# Patient Record
Sex: FEMALE | Race: BLACK OR AFRICAN AMERICAN | NOT HISPANIC OR LATINO | URBAN - METROPOLITAN AREA
[De-identification: names, ages, dates, MRNs, and addresses within clinical notes are randomized per-mention and may not be internally consistent; named-entity substitution may affect disease eponyms.]

---

## 2017-01-27 ENCOUNTER — HISTORICAL (OUTPATIENT)
Dept: BARIATRICS | Facility: HOSPITAL | Age: 52
End: 2017-01-27

## 2017-02-13 ENCOUNTER — HISTORICAL (OUTPATIENT)
Dept: ADMINISTRATIVE | Facility: HOSPITAL | Age: 52
End: 2017-02-13

## 2017-04-17 ENCOUNTER — HISTORICAL (OUTPATIENT)
Dept: ADMINISTRATIVE | Facility: HOSPITAL | Age: 52
End: 2017-04-17

## 2017-04-18 ENCOUNTER — HISTORICAL (OUTPATIENT)
Dept: BARIATRICS | Facility: HOSPITAL | Age: 52
End: 2017-04-18

## 2017-04-18 ENCOUNTER — HISTORICAL (OUTPATIENT)
Dept: ADMINISTRATIVE | Facility: HOSPITAL | Age: 52
End: 2017-04-18

## 2021-02-24 ENCOUNTER — HISTORICAL (OUTPATIENT)
Dept: BARIATRICS | Facility: HOSPITAL | Age: 56
End: 2021-02-24

## 2021-03-03 ENCOUNTER — HISTORICAL (OUTPATIENT)
Dept: RADIOLOGY | Facility: HOSPITAL | Age: 56
End: 2021-03-03

## 2021-05-19 ENCOUNTER — HISTORICAL (OUTPATIENT)
Dept: SURGERY | Facility: HOSPITAL | Age: 56
End: 2021-05-19

## 2021-06-24 ENCOUNTER — HISTORICAL (OUTPATIENT)
Dept: RADIOLOGY | Facility: HOSPITAL | Age: 56
End: 2021-06-24

## 2021-06-30 ENCOUNTER — HISTORICAL (OUTPATIENT)
Dept: ANESTHESIOLOGY | Facility: HOSPITAL | Age: 56
End: 2021-06-30

## 2021-08-03 ENCOUNTER — HISTORICAL (OUTPATIENT)
Dept: BARIATRICS | Facility: HOSPITAL | Age: 56
End: 2021-08-03

## 2021-09-07 ENCOUNTER — HISTORICAL (OUTPATIENT)
Dept: BARIATRICS | Facility: HOSPITAL | Age: 56
End: 2021-09-07

## 2021-10-05 ENCOUNTER — HISTORICAL (OUTPATIENT)
Dept: BARIATRICS | Facility: HOSPITAL | Age: 56
End: 2021-10-05

## 2021-10-20 ENCOUNTER — HISTORICAL (OUTPATIENT)
Dept: PREADMISSION TESTING | Facility: HOSPITAL | Age: 56
End: 2021-10-20

## 2021-10-20 LAB
ABS NEUT (OLG): 2.83 X10(3)/MCL (ref 2.1–9.2)
ALBUMIN SERPL-MCNC: 3.7 GM/DL (ref 3.5–5)
ALBUMIN/GLOB SERPL: 1.2 RATIO (ref 1.1–2)
ALP SERPL-CCNC: 93 UNIT/L (ref 40–150)
ALT SERPL-CCNC: 24 UNIT/L (ref 0–55)
AST SERPL-CCNC: 23 UNIT/L (ref 5–34)
BASOPHILS # BLD AUTO: 0 X10(3)/MCL (ref 0–0.2)
BASOPHILS NFR BLD AUTO: 0 %
BILIRUB SERPL-MCNC: 0.3 MG/DL
BILIRUBIN DIRECT+TOT PNL SERPL-MCNC: 0.1 MG/DL (ref 0–0.8)
BILIRUBIN DIRECT+TOT PNL SERPL-MCNC: 0.2 MG/DL (ref 0–0.5)
BUN SERPL-MCNC: 13.5 MG/DL (ref 9.8–20.1)
CALCIUM SERPL-MCNC: 9.4 MG/DL (ref 8.7–10.5)
CHLORIDE SERPL-SCNC: 106 MMOL/L (ref 98–107)
CO2 SERPL-SCNC: 31 MMOL/L (ref 22–29)
CREAT SERPL-MCNC: 0.66 MG/DL (ref 0.55–1.02)
EOSINOPHIL # BLD AUTO: 0.1 X10(3)/MCL (ref 0–0.9)
EOSINOPHIL NFR BLD AUTO: 2 %
ERYTHROCYTE [DISTWIDTH] IN BLOOD BY AUTOMATED COUNT: 14.1 % (ref 11.5–17)
GLOBULIN SER-MCNC: 3.1 GM/DL (ref 2.4–3.5)
GLUCOSE SERPL-MCNC: 75 MG/DL (ref 74–100)
HCT VFR BLD AUTO: 43.7 % (ref 37–47)
HGB BLD-MCNC: 13.6 GM/DL (ref 12–16)
LYMPHOCYTES # BLD AUTO: 1.8 X10(3)/MCL (ref 0.6–4.6)
LYMPHOCYTES NFR BLD AUTO: 34 %
MCH RBC QN AUTO: 30.6 PG (ref 27–31)
MCHC RBC AUTO-ENTMCNC: 31.1 GM/DL (ref 33–36)
MCV RBC AUTO: 98.4 FL (ref 80–94)
MONOCYTES # BLD AUTO: 0.6 X10(3)/MCL (ref 0.1–1.3)
MONOCYTES NFR BLD AUTO: 11 %
NEUTROPHILS # BLD AUTO: 2.83 X10(3)/MCL (ref 2.1–9.2)
NEUTROPHILS NFR BLD AUTO: 53 %
PLATELET # BLD AUTO: 261 X10(3)/MCL (ref 130–400)
PMV BLD AUTO: 10.5 FL (ref 9.4–12.4)
POTASSIUM SERPL-SCNC: 4.7 MMOL/L (ref 3.5–5.1)
PROT SERPL-MCNC: 6.8 GM/DL (ref 6.4–8.3)
RBC # BLD AUTO: 4.44 X10(6)/MCL (ref 4.2–5.4)
SARS-COV-2 AG RESP QL IA.RAPID: NEGATIVE
SODIUM SERPL-SCNC: 143 MMOL/L (ref 136–145)
WBC # SPEC AUTO: 5.4 X10(3)/MCL (ref 4.5–11.5)

## 2021-10-22 ENCOUNTER — HISTORICAL (OUTPATIENT)
Dept: ADMINISTRATIVE | Facility: HOSPITAL | Age: 56
End: 2021-10-22

## 2022-04-30 NOTE — OP NOTE
Patient:   Joy Akbar            MRN: [id number]            FIN: [id number]               Age:   56 years     Sex:  Female     :  1965   Associated Diagnoses:   HEARTBURN; History of bariatric surgery; HEARTBURN   Author:   Ang Venegas MD      Operative Note   Operative Information   Procedures Performed: Procedure Code   No active encounter procedure items have been selected or recorded., Esophagogastroduodenoscopy.     Preoperative Diagnosis: HEARTBURN (ZYB04-XI R12), History of bariatric surgery (KEQ22-VR Z98.84).     Postoperative Diagnosis: HEARTBURN (IYT48-FT R12), History of bariatric surgery (RIM27-WG Z98.84).     Surgeon: Ang Venegas MD.     Anesthesia: MAC.     Description of Procedure/Findings/    Complications: Patient was taken to the OR.  The patient's throat was aneshtetized.  MAC was utilized for anesthesia.   was easily passed.  Findings were as follows:  -Oropharynx: normal  -Airway: normal  -Esophagus: normal  -Stomach: normal SP VSG,  tex test negative, no HH  -Duodenum: normal.     Esimated blood loss: No blood loss.     Complications: None.

## 2022-04-30 NOTE — OP NOTE
Patient:   Joy Akbar            MRN: [id number]            FIN: [id number]               Age:   56 years     Sex:  Female     :  1965   Associated Diagnoses:   Cholelithiasis; Calculous cholecystitis   Author:   Ang Venegas MD      Operative Note   Operative Information   Procedures Performed.     Indications.     Preoperative Diagnosis: Cholelithiasis (ZWW75-MH K80.20), Calculous cholecystitis (TMA14-XV K80.10).     Postoperative Diagnosis: Cholelithiasis (EQR79-YZ K80.20), Calculous cholecystitis (PCR96-MV K80.10).     Surgeon: Ang Venegas MD.     Assistant: Mary Hayes     Anesthesia: Gen..     Speciman Removed: gallbladder.     Description of Procedure/Findings/    Complications:     The patient was taken to the operating room. Patient was placed under general anesthesia. Abdomen was prepped and draped in the usual sterile fashion. An appropriate timeout was performed. Optical tipped trocar was used to access the peritoneal cavity. Pneumoperitoneum was instituted. Abdominal exploration was negative. Gallbladder was noted and held in a cephalad direction. The infundibulum was noted and held in a lateral direction. The peritoneum overlying the infundibulum was dissected revealing the cystic duct gallbladder junction and the cystic artery. The critical view was obtained. The cystic duct and cystic artery were clipped and transected. The gallbladder was removed from the undersurface of the liver with sharp and electric dissection. Hemostasis was assured. The clips were in excellent position and there was no bleeding or leakage of bile. Gallbladder was completely removed from the liver hemostasis was assured. The gallbladder was removed from the abdomen. Once again hemostasis was assured the operative site was irrigated until clear. Trochars were removed and pneumoperitoneum released the incisions were closed with Vicryl..     Esimated blood loss: loss less than  15   cc.     Complications: None.

## 2022-04-30 NOTE — H&P
Patient:   Joy Akbar            MRN: [id number]            FIN: [id number]               Age:   56 years     Sex:  Female     :  1965   Associated Diagnoses:   GERD (gastroesophageal reflux disease); Heartburn; Hx of bariatric surgery; Nausea & vomiting; Obesity (BMI 30-39.9)   Author:   Mary Hayes      Basic Information   Source of history:  Self.    History limitation:  None.       Chief Complaint   Reflux, wt gain      History of Present Illness   Mrs. Akbar is a 55 yo female that presents to [facility] for EGD. Previous sleeve gastrectomy  with Dr. Venegas. Pt c/o intractable reflux and weight regain. H Pylori + 3/3/21, treated with eradication Abx. No complaints at present. Ready to proceed with EGD to consider anti-reflux procedure or revisional bariatric surgery in the future.   UGI 3/3/21 negative.       Review of Systems   Constitutional:  Negative.    Eye:  Negative.    Ear/Nose/Mouth/Throat:  Negative.    Respiratory:  Negative.    Cardiovascular:  Negative.    Gastrointestinal:  Heartburn.    Genitourinary:  Negative.    Gynecologic:  Negative.    Hematology/Lymphatics:  Negative.    Endocrine:  Negative.    Immunologic:  Negative.    Musculoskeletal:  Negative.    Integumentary:  Negative.    Neurologic:  Negative.    Psychiatric:  Negative.       Health Status   Allergies:    Allergic Reactions (Selected)  No Known Medication Allergies   Current medications:  (Selected)   Inpatient Medications  Ordered  Lactated Ringers Infusion 1,000 mL: 1,000 mL, 1,000 mL, IV, 20 mL/hr, start date 21 6:35:00 CDT  Documented Medications  Documented  Adderall 20 mg oral tablet: Daily, 0 Refill(s)  Calcium 600+D: 0 Refill(s)  Ferralet Tab: 1 tab(s), Oral, Daily  Hydroxyurea 500 Mg Capsul: 500 mg = 1 cap(s), Oral, BID  Pravastatin Sodium 20 Mg Tab: 20 mg = 1 tab(s), Oral, Daily  Zofran ODT 4 mg oral tablet, disintegratin mg = 1 tab(s), Oral, q4hr, PRN PRN nausea/vomiting, 0  Refill(s)  acetaminophen-hydrocodone 325 mg-7.5 mg/15 mL oral solution: 15 mL, Oral, q4hr, PRN PRN pain, moderate, 0 Refill(s)  atorvastatin 20 mg oral tablet: At Bedtime, 0 Refill(s)  cloniDINE 0.1 mg oral tablet: At Bedtime, 0 Refill(s)  olmesartan 40 mg oral tablet: Daily, 0 Refill(s)   Problem list:    All Problems  Wears glasses / SNOMED CT 946288702 / Confirmed  Hypertension / SNOMED CT 78806764 / Confirmed  Hypercholesterolemia / SNOMED CT 59475464 / Confirmed  Essential thrombocytosis / SNOMED CT 5107228881 / Confirmed  pt sees Dr. Neves  Colon polyps / SNOMED CT [id number] / Confirmed  Morbid obesity / SNOMED CT 226119660 / Probable      Histories   Past Medical History:    No active or resolved past medical history items have been selected or recorded.   Family History:    Primary malignant neoplasm of colon  Father  Primary malignant neoplasm of breast  Mother  Diabetes mellitus type 2  Sister  Asthma.  Brother  Hypertension.  Father  Mother  Brother  Sister     Procedure history:    Gastric Sleeve Laparoscopic (.) performed by Ang Venegas MD on 8/29/2016 at 51 Years.  Comments:  8/29/2016 8:30 LEONIE - Pako SHULTZ, Arabella LEUNG  auto-populated from documented surgical case  Esophagogastroduodenoscopy performed by Ang Venegas MD on 8/26/2016 at 51 Years.  Comments:  8/26/2016 7:37 Yoana Haines RN  auto-populated from documented surgical case  colonoscopy.  Breast reduction (CPT4 75011).  abdominoplasty.   Social History        Social & Psychosocial Habits    Alcohol  08/26/2016  Use: Never    Substance Use  08/24/2016  Use: Never    Tobacco  08/24/2016  Use: Never smoker    05/17/2021  Use: Never (less than 100 in l    Patient Wants Consult For Cessation Counseling N/A    05/19/2021  Use: Never (less than 100 in l    Patient Wants Consult For Cessation Counseling N/A    Abuse/Neglect  05/17/2021  SHX Any signs of abuse or neglect No    05/19/2021  SHX Any  signs of abuse or neglect No    Spiritual/Cultural  05/17/2021  Hindu Preference Adventist  .        Physical Examination   Vital Signs   5/19/2021 6:53 CDT       Oxygen Therapy            Room air    5/19/2021 6:30 CDT       Temperature Oral          37 DegC                             Temperature Oral (calculated)             98.60 DegF                             Peripheral Pulse Rate     76 bpm                             SpO2                      99 %                             Systolic Blood Pressure   138 mmHg                             Diastolic Blood Pressure  96 mmHg  HI                             Mean Arterial Pressure, Cuff              110 mmHg    5/19/2021 6:30 CDT       Respiratory Rate          16 br/min     Measurements from flowsheet : Measurements   5/19/2021 6:36 CDT       Weight Dosing             92.4 kg                             Weight Measured           92.4 kg                             Weight Measured and Calculated in Lbs     203.71 lb                             Height/Length Dosing      162.6 cm                             Height/Length Measured    162.6 cm                             Body Mass Index Measured  34.95 kg/m2     General:  Alert and oriented, No acute distress.    Neck:  Supple.    Respiratory:  Lungs are clear to auscultation, Respirations are non-labored, Breath sounds are equal.    Cardiovascular:  Normal rate, Regular rhythm.    Gastrointestinal:  Soft, Non-tender.    Musculoskeletal:  Normal range of motion.    Integumentary:  Warm, Dry.    Neurologic:  Alert, Oriented.    Psychiatric:  Cooperative, Appropriate mood & affect.       Impression and Plan   Diagnosis     GERD (gastroesophageal reflux disease) (HTY13-ZW K21.9).     Heartburn (DPI04-OJ R12).     Hx of bariatric surgery (YUM74-EK Z98.84).     Nausea & vomiting (FUS33-OQ R11.2).     Obesity (BMI 30-39.9) (TED80-AJ E66.9).     Education and Follow-up:       Counseled: Patient, Regarding diagnosis,  Regarding treatment.    EGD with HOLLY test   Dr. Venegas examined patient, obtained informed consent, and answered all questions.

## 2022-04-30 NOTE — H&P
Patient:   Joy Akbar            MRN: [id number]            FIN: [id number]               Age:   56 years     Sex:  Female     :  1965   Associated Diagnoses:   Sludge in gallbladder   Author:   Mary Hayes      Basic Information   Source of history:  Self.    History limitation:  None.       Chief Complaint   Lap mushtaq       History of Present Illness   Mrs. Akbar is a 55 yo female that presents to [facility] for elective cholecystectomy. History of gallbladder sludge on US, HIDA scan 93%. Remote VSG in 2016. No new complaints.       Review of Systems   Constitutional:  Negative.    Eye:  Negative.    Ear/Nose/Mouth/Throat:  Negative.    Respiratory:  Negative.    Cardiovascular:  Negative.    Gastrointestinal:  Negative.    Genitourinary:  Negative.    Gynecologic:  Negative.    Hematology/Lymphatics:  Negative.    Endocrine:  Negative.    Immunologic:  Negative.    Musculoskeletal:  Negative.    Integumentary:  Negative.    Neurologic:  Negative.    Psychiatric:  Negative.       Health Status   Allergies:    Allergic Reactions (Selected)  No Known Medication Allergies   Current medications:  (Selected)   Documented Medications  Documented  Adderall 20 mg oral tablet: Daily, 0 Refill(s)  Hydroxyurea 500 Mg Capsul: 500 mg = 1 cap(s), Oral, BID  atorvastatin 20 mg oral tablet: At Bedtime, 0 Refill(s)  cloniDINE 0.1 mg oral tablet: At Bedtime, 0 Refill(s)  olmesartan 40 mg oral tablet: Daily, 0 Refill(s)   Problem list:    All Problems  Wears glasses / SNOMED CT 575084601 / Confirmed  Hypertension / SNOMED CT 99263229 / Confirmed  Hypercholesterolemia / SNOMED CT 95482860 / Confirmed  Essential thrombocytosis / SNOMED CT 3487175536 / Confirmed  pt sees Dr. Neves  Colon polyps / SNOMED CT [id number] / Confirmed  Morbid obesity / SNOMED CT 850936638 / Probable      Histories   Past Medical History:    No active or resolved past medical history items have been  selected or recorded.   Family History:    Primary malignant neoplasm of colon  Father  Primary malignant neoplasm of breast  Mother  Diabetes mellitus type 2  Sister  Asthma.  Brother  Hypertension.  Father  Mother  Brother  Sister     Procedure history:    Esophagogastroduodenoscopy performed by Ang Venegas MD on 5/19/2021 at 56 Years.  Comments:  5/19/2021 7:38 CDT - Lejeune RN, mAe Seymour  auto-populated from documented surgical case  Gastric Sleeve Laparoscopic (.) performed by Ang Venegas MD on 8/29/2016 at 51 Years.  Comments:  8/29/2016 8:30 LEONIE Min RN, Arabella LEUNG  auto-populated from documented surgical case  Esophagogastroduodenoscopy performed by Ang Venegas MD on 8/26/2016 at 51 Years.  Comments:  8/26/2016 7:37 Yoana Haines RN  auto-populated from documented surgical case  colonoscopy.  Breast reduction (CPT4 91687).  abdominoplasty.   Social History        Social & Psychosocial Habits    Alcohol  08/26/2016  Use: Never    Substance Use  08/24/2016  Use: Never    Tobacco  08/24/2016  Use: Never smoker    05/17/2021  Use: Never (less than 100 in l    Patient Wants Consult For Cessation Counseling N/A    05/19/2021  Use: Never (less than 100 in l    Patient Wants Consult For Cessation Counseling N/A    Abuse/Neglect  05/17/2021  SHX Any signs of abuse or neglect No    05/19/2021  SHX Any signs of abuse or neglect No    Spiritual/Cultural  05/17/2021  Rastafarian Preference [employer]  .        Physical Examination   Vital Signs   10/22/2021 7:18 CDT      Temperature Oral          36.6 DegC                             Temperature Oral (calculated)             97.88 DegF                             Peripheral Pulse Rate     85 bpm                             SpO2                      97 %                             Systolic Blood Pressure   151 mmHg  HI                             Diastolic Blood Pressure  91 mmHg  HI                             Mean Arterial Pressure, Cuff               111 mmHg     General:  Alert and oriented, No acute distress.    Neck:  Supple.    Respiratory:  Lungs are clear to auscultation, Respirations are non-labored.    Cardiovascular:  Normal rate, Regular rhythm.    Gastrointestinal:  Soft, Non-tender.    Musculoskeletal:  Normal range of motion.    Integumentary:  Warm, Dry.    Neurologic:  Alert, Oriented.    Psychiatric:  Cooperative, Appropriate mood & affect.       Impression and Plan   Diagnosis     Sludge in gallbladder (FNM77-DN K82.8).     Education and Follow-up:       Counseled: Patient, Regarding diagnosis, Regarding treatment.    Lap cholecystectomy  Dr. Venegas examined patient, obtained informed consent, and answered all questions.

## 2022-04-30 NOTE — H&P
Patient:   Joy Akbar            MRN: [id number]            FIN: [id number]               Age:   56 years     Sex:  Female     :  1965   Associated Diagnoses:   GERD (gastroesophageal reflux disease)   Author:   Ang Venegas MD      Basic Information   Admit information:  Presents for EGD to evaluate anatomy  c/o GERD .       Health Status   Allergies:    Allergic Reactions (Selected)  No Known Medication Allergies   Current medications:    Medications (1) Active  Scheduled: (0)  Continuous: (1)  lactated ringers 1,000 mL  1,000 mL, IV, 20 mL/hr  PRN: (0)        Histories   Past Medical History:    No active or resolved past medical history items have been selected or recorded.   Social History        Social & Psychosocial Habits    Alcohol  2016  Use: Never    Substance Use  2016  Use: Never    Tobacco  2016  Use: Never smoker    2021  Use: Never (less than 100 in l    Patient Wants Consult For Cessation Counseling N/A    2021  Use: Never (less than 100 in l    Patient Wants Consult For Cessation Counseling N/A    Abuse/Neglect  2021  SHX Any signs of abuse or neglect No    2021  SHX Any signs of abuse or neglect No    Spiritual/Cultural  2021  Gnosticism Preference Oriental orthodox  .        Physical Examination   General:  Alert and oriented.    HENT:  Normocephalic.    Neck:  Supple.    Respiratory:  Lungs are clear to auscultation.    Cardiovascular:  Normal rate.    Gastrointestinal:       Abdomen: Obese.    Genitourinary:  No costovertebral angle tenderness.    Musculoskeletal:  Normal range of motion.    Integumentary:  Warm.    Neurologic:  Alert.       Review / Management   Results review:     No qualifying data available.       Impression and Plan   Diagnosis     GERD (gastroesophageal reflux disease) (COH09-HR K21.9).       EGD